# Patient Record
Sex: MALE | Race: WHITE | NOT HISPANIC OR LATINO | Employment: OTHER | URBAN - METROPOLITAN AREA
[De-identification: names, ages, dates, MRNs, and addresses within clinical notes are randomized per-mention and may not be internally consistent; named-entity substitution may affect disease eponyms.]

---

## 2018-04-27 ENCOUNTER — HOSPITAL ENCOUNTER (INPATIENT)
Facility: HOSPITAL | Age: 78
LOS: 2 days | Discharge: HOME | End: 2018-05-01
Attending: INTERNAL MEDICINE | Admitting: INTERNAL MEDICINE

## 2018-04-27 ENCOUNTER — APPOINTMENT (OUTPATIENT)
Dept: CARDIOLOGY | Facility: HOSPITAL | Age: 78
End: 2018-04-27

## 2018-04-27 DIAGNOSIS — I45.9 HEART BLOCK: Primary | ICD-10-CM

## 2018-04-27 LAB
ANION GAP SERPL CALCULATED.3IONS-SCNC: 8 MMOL/L (ref 3–11)
BH CV ECHO MEAS - AO MAX PG (FULL): 5.3 MMHG
BH CV ECHO MEAS - AO MAX PG: 9 MMHG
BH CV ECHO MEAS - AO ROOT AREA (BSA CORRECTED): 1.5
BH CV ECHO MEAS - AO ROOT AREA: 8.8 CM^2
BH CV ECHO MEAS - AO ROOT DIAM: 3.3 CM
BH CV ECHO MEAS - AO V2 MAX: 152 CM/SEC
BH CV ECHO MEAS - AVA(V,A): 2.1 CM^2
BH CV ECHO MEAS - AVA(V,D): 2.1 CM^2
BH CV ECHO MEAS - BSA(HAYCOCK): 2.3 M^2
BH CV ECHO MEAS - BSA: 2.2 M^2
BH CV ECHO MEAS - BZI_BMI: 35.6 KILOGRAMS/M^2
BH CV ECHO MEAS - BZI_METRIC_HEIGHT: 172.7 CM
BH CV ECHO MEAS - BZI_METRIC_WEIGHT: 106.1 KG
BH CV ECHO MEAS - CONTRAST EF (2CH): 59.5 ML/M^2
BH CV ECHO MEAS - CONTRAST EF 4CH: 61.4 ML/M^2
BH CV ECHO MEAS - EDV(CUBED): 161.2 ML
BH CV ECHO MEAS - EDV(MOD-SP2): 126 ML
BH CV ECHO MEAS - EDV(MOD-SP4): 127 ML
BH CV ECHO MEAS - EDV(TEICH): 143.9 ML
BH CV ECHO MEAS - EF(CUBED): 73.7 %
BH CV ECHO MEAS - EF(MOD-SP2): 59.5 %
BH CV ECHO MEAS - EF(MOD-SP4): 61.4 %
BH CV ECHO MEAS - EF(TEICH): 65 %
BH CV ECHO MEAS - ESV(CUBED): 42.4 ML
BH CV ECHO MEAS - ESV(MOD-SP2): 51 ML
BH CV ECHO MEAS - ESV(MOD-SP4): 49 ML
BH CV ECHO MEAS - ESV(TEICH): 50.4 ML
BH CV ECHO MEAS - FS: 35.9 %
BH CV ECHO MEAS - IVS/LVPW: 1.1
BH CV ECHO MEAS - IVSD: 1.2 CM
BH CV ECHO MEAS - LA DIMENSION: 4.7 CM
BH CV ECHO MEAS - LA/AO: 1.4
BH CV ECHO MEAS - LAT PEAK E' VEL: 5.6 CM/SEC
BH CV ECHO MEAS - LV DIASTOLIC VOL/BSA (35-75): 58.1 ML/M^2
BH CV ECHO MEAS - LV MASS(C)D: 262.4 GRAMS
BH CV ECHO MEAS - LV MASS(C)DI: 120.1 GRAMS/M^2
BH CV ECHO MEAS - LV MAX PG: 3.7 MMHG
BH CV ECHO MEAS - LV SYSTOLIC VOL/BSA (12-30): 22.4 ML/M^2
BH CV ECHO MEAS - LV V1 MAX: 95.3 CM/SEC
BH CV ECHO MEAS - LVIDD: 5.4 CM
BH CV ECHO MEAS - LVIDS: 3.5 CM
BH CV ECHO MEAS - LVLD AP2: 8.8 CM
BH CV ECHO MEAS - LVLD AP4: 8.8 CM
BH CV ECHO MEAS - LVLS AP2: 7.7 CM
BH CV ECHO MEAS - LVLS AP4: 7.5 CM
BH CV ECHO MEAS - LVOT AREA (M): 3.5 CM^2
BH CV ECHO MEAS - LVOT AREA: 3.4 CM^2
BH CV ECHO MEAS - LVOT DIAM: 2.1 CM
BH CV ECHO MEAS - LVPWD: 1.1 CM
BH CV ECHO MEAS - MED PEAK E' VEL: 8.95 CM/SEC
BH CV ECHO MEAS - MV A MAX VEL: 32.7 CM/SEC
BH CV ECHO MEAS - MV DEC TIME: 0.14 SEC
BH CV ECHO MEAS - MV E MAX VEL: 127.1 CM/SEC
BH CV ECHO MEAS - MV E/A: 3.9
BH CV ECHO MEAS - PA ACC SLOPE: 2420 CM/SEC^2
BH CV ECHO MEAS - PA ACC TIME: 0.06 SEC
BH CV ECHO MEAS - PA PR(ACCEL): 53.6 MMHG
BH CV ECHO MEAS - RVDD: 4.2 CM
BH CV ECHO MEAS - RVSP: 58 MMHG
BH CV ECHO MEAS - SI(CUBED): 54.4 ML/M^2
BH CV ECHO MEAS - SI(MOD-SP2): 34.3 ML/M^2
BH CV ECHO MEAS - SI(MOD-SP4): 35.7 ML/M^2
BH CV ECHO MEAS - SI(TEICH): 42.8 ML/M^2
BH CV ECHO MEAS - SV(CUBED): 118.8 ML
BH CV ECHO MEAS - SV(MOD-SP2): 75 ML
BH CV ECHO MEAS - SV(MOD-SP4): 78 ML
BH CV ECHO MEAS - SV(TEICH): 93.5 ML
BH CV ECHO MEAS - TAPSE (>1.6): 2 CM2
BH CV ECHO MEAS - TR MAX V: 50 MMHG
BH CV ECHO MEAS - TR MAX VEL: 290 CM/SEC
BH CV ECHO MEASUREMENTS AVERAGE E/E' RATIO: 17.47
BH CV XLRA - RV BASE: 6.3 CM
BH CV XLRA - RV LENGTH: 8.3 CM
BH CV XLRA - RV MID: 5.4 CM
BH CV XLRA - TDI S': 9.45 CM/SEC
BUN BLD-MCNC: 20 MG/DL (ref 9–23)
BUN/CREAT SERPL: 16.7 (ref 7–25)
CALCIUM SPEC-SCNC: 8.7 MG/DL (ref 8.7–10.4)
CHLORIDE SERPL-SCNC: 105 MMOL/L (ref 99–109)
CO2 SERPL-SCNC: 30 MMOL/L (ref 20–31)
CREAT BLD-MCNC: 1.2 MG/DL (ref 0.6–1.3)
DEPRECATED RDW RBC AUTO: 48.1 FL (ref 37–54)
ERYTHROCYTE [DISTWIDTH] IN BLOOD BY AUTOMATED COUNT: 14.2 % (ref 11.3–14.5)
GFR SERPL CREATININE-BSD FRML MDRD: 59 ML/MIN/1.73
GLUCOSE BLD-MCNC: 123 MG/DL (ref 70–100)
GLUCOSE BLDC GLUCOMTR-MCNC: 152 MG/DL (ref 70–130)
HCT VFR BLD AUTO: 45.1 % (ref 38.9–50.9)
HGB BLD-MCNC: 14.3 G/DL (ref 13.1–17.5)
LEFT ATRIUM VOLUME INDEX: 42.7 ML/M2
MAXIMAL PREDICTED HEART RATE: 144 BPM
MCH RBC QN AUTO: 29.2 PG (ref 27–31)
MCHC RBC AUTO-ENTMCNC: 31.7 G/DL (ref 32–36)
MCV RBC AUTO: 92.2 FL (ref 80–99)
PLATELET # BLD AUTO: 188 10*3/MM3 (ref 150–450)
PMV BLD AUTO: 10 FL (ref 6–12)
POTASSIUM BLD-SCNC: 3.1 MMOL/L (ref 3.5–5.5)
RBC # BLD AUTO: 4.89 10*6/MM3 (ref 4.2–5.76)
SODIUM BLD-SCNC: 143 MMOL/L (ref 132–146)
STRESS TARGET HR: 122 BPM
TSH SERPL DL<=0.05 MIU/L-ACNC: 4.21 MIU/ML (ref 0.35–5.35)
WBC NRBC COR # BLD: 9.9 10*3/MM3 (ref 3.5–10.8)

## 2018-04-27 PROCEDURE — G0378 HOSPITAL OBSERVATION PER HR: HCPCS

## 2018-04-27 PROCEDURE — 85027 COMPLETE CBC AUTOMATED: CPT | Performed by: PHYSICIAN ASSISTANT

## 2018-04-27 PROCEDURE — 82962 GLUCOSE BLOOD TEST: CPT

## 2018-04-27 PROCEDURE — 80048 BASIC METABOLIC PNL TOTAL CA: CPT | Performed by: PHYSICIAN ASSISTANT

## 2018-04-27 PROCEDURE — 25010000002 ENOXAPARIN PER 10 MG: Performed by: PHYSICIAN ASSISTANT

## 2018-04-27 PROCEDURE — 93005 ELECTROCARDIOGRAM TRACING: CPT | Performed by: NURSE PRACTITIONER

## 2018-04-27 PROCEDURE — 84443 ASSAY THYROID STIM HORMONE: CPT | Performed by: PHYSICIAN ASSISTANT

## 2018-04-27 PROCEDURE — 25010000002 FUROSEMIDE PER 20 MG: Performed by: PHYSICIAN ASSISTANT

## 2018-04-27 PROCEDURE — 93306 TTE W/DOPPLER COMPLETE: CPT

## 2018-04-27 RX ORDER — ASPIRIN 81 MG/1
81 TABLET ORAL DAILY
Status: DISCONTINUED | OUTPATIENT
Start: 2018-04-27 | End: 2018-05-01 | Stop reason: HOSPADM

## 2018-04-27 RX ORDER — PANTOPRAZOLE SODIUM 40 MG/1
40 TABLET, DELAYED RELEASE ORAL EVERY MORNING
Status: DISCONTINUED | OUTPATIENT
Start: 2018-04-28 | End: 2018-05-01 | Stop reason: HOSPADM

## 2018-04-27 RX ORDER — AMLODIPINE BESYLATE 2.5 MG/1
2.5 TABLET ORAL
Status: DISCONTINUED | OUTPATIENT
Start: 2018-04-27 | End: 2018-04-28

## 2018-04-27 RX ORDER — ROPINIROLE 1 MG/1
1 TABLET, FILM COATED ORAL NIGHTLY
Status: ON HOLD | COMMUNITY
End: 2019-11-22

## 2018-04-27 RX ORDER — LEVOTHYROXINE SODIUM 88 UG/1
88 TABLET ORAL DAILY
Status: ON HOLD | COMMUNITY
End: 2019-11-22

## 2018-04-27 RX ORDER — TRAMADOL HYDROCHLORIDE 50 MG/1
50 TABLET ORAL EVERY 8 HOURS PRN
Status: DISCONTINUED | OUTPATIENT
Start: 2018-04-27 | End: 2018-04-30 | Stop reason: SDUPTHER

## 2018-04-27 RX ORDER — LISINOPRIL 20 MG/1
20 TABLET ORAL 2 TIMES DAILY
Status: ON HOLD | COMMUNITY
End: 2019-11-22

## 2018-04-27 RX ORDER — TERAZOSIN 5 MG/1
5 CAPSULE ORAL NIGHTLY
Status: DISCONTINUED | OUTPATIENT
Start: 2018-04-27 | End: 2018-05-01 | Stop reason: HOSPADM

## 2018-04-27 RX ORDER — HYDRALAZINE HYDROCHLORIDE 50 MG/1
50 TABLET, FILM COATED ORAL 2 TIMES DAILY
Status: DISCONTINUED | OUTPATIENT
Start: 2018-04-27 | End: 2018-05-01 | Stop reason: HOSPADM

## 2018-04-27 RX ORDER — DOXAZOSIN MESYLATE 4 MG/1
4 TABLET ORAL DAILY
Status: ON HOLD | COMMUNITY
End: 2019-11-22

## 2018-04-27 RX ORDER — OMEPRAZOLE 20 MG/1
20 CAPSULE, DELAYED RELEASE ORAL DAILY
Status: ON HOLD | COMMUNITY
End: 2019-11-22

## 2018-04-27 RX ORDER — HYDRALAZINE HYDROCHLORIDE 50 MG/1
50 TABLET, FILM COATED ORAL DAILY
COMMUNITY
End: 2019-11-22 | Stop reason: HOSPADM

## 2018-04-27 RX ORDER — NEBIVOLOL 10 MG/1
10 TABLET ORAL DAILY
Status: ON HOLD | COMMUNITY
End: 2019-11-22

## 2018-04-27 RX ORDER — FUROSEMIDE 10 MG/ML
40 INJECTION INTRAMUSCULAR; INTRAVENOUS ONCE
Status: COMPLETED | OUTPATIENT
Start: 2018-04-27 | End: 2018-04-27

## 2018-04-27 RX ORDER — TRAMADOL HYDROCHLORIDE 50 MG/1
50 TABLET ORAL EVERY 8 HOURS PRN
COMMUNITY

## 2018-04-27 RX ORDER — MIRTAZAPINE 30 MG/1
30 TABLET, FILM COATED ORAL NIGHTLY
Status: ON HOLD | COMMUNITY
End: 2019-11-22

## 2018-04-27 RX ORDER — FUROSEMIDE 40 MG/1
40 TABLET ORAL AS NEEDED
Status: ON HOLD | COMMUNITY
End: 2019-11-22

## 2018-04-27 RX ORDER — ENALAPRILAT 2.5 MG/2ML
1.25 INJECTION INTRAVENOUS EVERY 6 HOURS PRN
Status: DISCONTINUED | OUTPATIENT
Start: 2018-04-27 | End: 2018-05-01 | Stop reason: HOSPADM

## 2018-04-27 RX ORDER — POTASSIUM CHLORIDE 750 MG/1
40 CAPSULE, EXTENDED RELEASE ORAL AS NEEDED
Status: DISCONTINUED | OUTPATIENT
Start: 2018-04-27 | End: 2018-05-01 | Stop reason: HOSPADM

## 2018-04-27 RX ORDER — POTASSIUM CHLORIDE 1.5 G/1.77G
40 POWDER, FOR SOLUTION ORAL AS NEEDED
Status: DISCONTINUED | OUTPATIENT
Start: 2018-04-27 | End: 2018-05-01 | Stop reason: HOSPADM

## 2018-04-27 RX ORDER — MIRTAZAPINE 15 MG/1
30 TABLET, FILM COATED ORAL NIGHTLY
Status: DISCONTINUED | OUTPATIENT
Start: 2018-04-27 | End: 2018-05-01 | Stop reason: HOSPADM

## 2018-04-27 RX ORDER — POTASSIUM CHLORIDE 750 MG/1
20 CAPSULE, EXTENDED RELEASE ORAL ONCE
Status: COMPLETED | OUTPATIENT
Start: 2018-04-27 | End: 2018-04-27

## 2018-04-27 RX ORDER — ROPINIROLE 0.5 MG/1
1 TABLET, FILM COATED ORAL NIGHTLY
Status: DISCONTINUED | OUTPATIENT
Start: 2018-04-27 | End: 2018-05-01 | Stop reason: HOSPADM

## 2018-04-27 RX ORDER — LEVOTHYROXINE SODIUM 88 UG/1
88 TABLET ORAL
Status: DISCONTINUED | OUTPATIENT
Start: 2018-04-27 | End: 2018-05-01 | Stop reason: HOSPADM

## 2018-04-27 RX ORDER — LISINOPRIL 20 MG/1
20 TABLET ORAL 2 TIMES DAILY
Status: DISCONTINUED | OUTPATIENT
Start: 2018-04-27 | End: 2018-05-01 | Stop reason: HOSPADM

## 2018-04-27 RX ADMIN — POTASSIUM CHLORIDE 20 MEQ: 750 CAPSULE, EXTENDED RELEASE ORAL at 13:10

## 2018-04-27 RX ADMIN — AMLODIPINE BESYLATE 2.5 MG: 2.5 TABLET ORAL at 16:32

## 2018-04-27 RX ADMIN — POTASSIUM CHLORIDE 40 MEQ: 750 CAPSULE, EXTENDED RELEASE ORAL at 16:32

## 2018-04-27 RX ADMIN — HYDRALAZINE HYDROCHLORIDE 50 MG: 50 TABLET, FILM COATED ORAL at 20:45

## 2018-04-27 RX ADMIN — TERAZOSIN HYDROCHLORIDE ANHYDROUS 5 MG: 5 CAPSULE ORAL at 20:45

## 2018-04-27 RX ADMIN — ENOXAPARIN SODIUM 40 MG: 40 INJECTION SUBCUTANEOUS at 20:45

## 2018-04-27 RX ADMIN — ENALAPRILAT 1.25 MG: 1.25 INJECTION INTRAVENOUS at 16:33

## 2018-04-27 RX ADMIN — MIRTAZAPINE 30 MG: 15 TABLET, FILM COATED ORAL at 20:44

## 2018-04-27 RX ADMIN — LISINOPRIL 20 MG: 20 TABLET ORAL at 20:45

## 2018-04-27 RX ADMIN — TRAMADOL HYDROCHLORIDE 50 MG: 50 TABLET, COATED ORAL at 20:45

## 2018-04-27 RX ADMIN — FUROSEMIDE 40 MG: 10 INJECTION, SOLUTION INTRAMUSCULAR; INTRAVENOUS at 13:10

## 2018-04-27 RX ADMIN — ASPIRIN 81 MG: 81 TABLET, COATED ORAL at 13:10

## 2018-04-27 RX ADMIN — NICARDIPINE HYDROCHLORIDE 5 MG/HR: 0.1 INJECTION, SOLUTION INTRAVENOUS at 17:44

## 2018-04-27 RX ADMIN — POTASSIUM CHLORIDE 40 MEQ: 750 CAPSULE, EXTENDED RELEASE ORAL at 20:44

## 2018-04-27 RX ADMIN — ROPINIROLE 1 MG: 0.5 TABLET, FILM COATED ORAL at 20:43

## 2018-04-28 LAB
ANION GAP SERPL CALCULATED.3IONS-SCNC: 6 MMOL/L (ref 3–11)
BNP SERPL-MCNC: 887 PG/ML (ref 0–100)
BUN BLD-MCNC: 22 MG/DL (ref 9–23)
BUN/CREAT SERPL: 18.3 (ref 7–25)
CALCIUM SPEC-SCNC: 8.2 MG/DL (ref 8.7–10.4)
CHLORIDE SERPL-SCNC: 108 MMOL/L (ref 99–109)
CO2 SERPL-SCNC: 34 MMOL/L (ref 20–31)
CREAT BLD-MCNC: 1.2 MG/DL (ref 0.6–1.3)
GFR SERPL CREATININE-BSD FRML MDRD: 59 ML/MIN/1.73
GLUCOSE BLD-MCNC: 107 MG/DL (ref 70–100)
POTASSIUM BLD-SCNC: 3.7 MMOL/L (ref 3.5–5.5)
SODIUM BLD-SCNC: 148 MMOL/L (ref 132–146)

## 2018-04-28 PROCEDURE — 25010000002 ENOXAPARIN PER 10 MG: Performed by: PHYSICIAN ASSISTANT

## 2018-04-28 PROCEDURE — 80048 BASIC METABOLIC PNL TOTAL CA: CPT | Performed by: PHYSICIAN ASSISTANT

## 2018-04-28 PROCEDURE — 93005 ELECTROCARDIOGRAM TRACING: CPT | Performed by: PHYSICIAN ASSISTANT

## 2018-04-28 PROCEDURE — 25010000002 FUROSEMIDE PER 20 MG: Performed by: PHYSICIAN ASSISTANT

## 2018-04-28 PROCEDURE — G0378 HOSPITAL OBSERVATION PER HR: HCPCS

## 2018-04-28 PROCEDURE — 83880 ASSAY OF NATRIURETIC PEPTIDE: CPT | Performed by: PHYSICIAN ASSISTANT

## 2018-04-28 RX ORDER — FUROSEMIDE 10 MG/ML
40 INJECTION INTRAMUSCULAR; INTRAVENOUS ONCE
Status: COMPLETED | OUTPATIENT
Start: 2018-04-28 | End: 2018-04-28

## 2018-04-28 RX ORDER — POTASSIUM CHLORIDE 750 MG/1
20 CAPSULE, EXTENDED RELEASE ORAL ONCE
Status: COMPLETED | OUTPATIENT
Start: 2018-04-28 | End: 2018-04-28

## 2018-04-28 RX ORDER — AMLODIPINE BESYLATE 5 MG/1
5 TABLET ORAL
Status: DISCONTINUED | OUTPATIENT
Start: 2018-04-28 | End: 2018-05-01 | Stop reason: HOSPADM

## 2018-04-28 RX ADMIN — ENALAPRILAT 1.25 MG: 1.25 INJECTION INTRAVENOUS at 16:50

## 2018-04-28 RX ADMIN — PANTOPRAZOLE SODIUM 40 MG: 40 TABLET, DELAYED RELEASE ORAL at 06:09

## 2018-04-28 RX ADMIN — HYDRALAZINE HYDROCHLORIDE 50 MG: 50 TABLET, FILM COATED ORAL at 08:39

## 2018-04-28 RX ADMIN — LISINOPRIL 20 MG: 20 TABLET ORAL at 20:59

## 2018-04-28 RX ADMIN — ENOXAPARIN SODIUM 40 MG: 40 INJECTION SUBCUTANEOUS at 20:59

## 2018-04-28 RX ADMIN — POTASSIUM CHLORIDE 40 MEQ: 750 CAPSULE, EXTENDED RELEASE ORAL at 02:51

## 2018-04-28 RX ADMIN — FUROSEMIDE 40 MG: 10 INJECTION, SOLUTION INTRAMUSCULAR; INTRAVENOUS at 08:39

## 2018-04-28 RX ADMIN — ROPINIROLE 1 MG: 0.5 TABLET, FILM COATED ORAL at 20:59

## 2018-04-28 RX ADMIN — TRAMADOL HYDROCHLORIDE 50 MG: 50 TABLET, COATED ORAL at 21:06

## 2018-04-28 RX ADMIN — POTASSIUM CHLORIDE 20 MEQ: 750 CAPSULE, EXTENDED RELEASE ORAL at 08:39

## 2018-04-28 RX ADMIN — HYDRALAZINE HYDROCHLORIDE 50 MG: 50 TABLET, FILM COATED ORAL at 20:59

## 2018-04-28 RX ADMIN — LISINOPRIL 20 MG: 20 TABLET ORAL at 08:39

## 2018-04-28 RX ADMIN — MIRTAZAPINE 30 MG: 15 TABLET, FILM COATED ORAL at 20:59

## 2018-04-28 RX ADMIN — LEVOTHYROXINE SODIUM 88 MCG: 88 TABLET ORAL at 06:09

## 2018-04-28 RX ADMIN — ASPIRIN 81 MG: 81 TABLET, COATED ORAL at 08:38

## 2018-04-28 RX ADMIN — TERAZOSIN HYDROCHLORIDE ANHYDROUS 5 MG: 5 CAPSULE ORAL at 20:59

## 2018-04-28 RX ADMIN — AMLODIPINE BESYLATE 5 MG: 5 TABLET ORAL at 08:38

## 2018-04-29 LAB
GLUCOSE BLDC GLUCOMTR-MCNC: 103 MG/DL (ref 70–130)
GLUCOSE BLDC GLUCOMTR-MCNC: 114 MG/DL (ref 70–130)
GLUCOSE BLDC GLUCOMTR-MCNC: 119 MG/DL (ref 70–130)
GLUCOSE BLDC GLUCOMTR-MCNC: 122 MG/DL (ref 70–130)

## 2018-04-29 PROCEDURE — 82962 GLUCOSE BLOOD TEST: CPT

## 2018-04-29 PROCEDURE — G0378 HOSPITAL OBSERVATION PER HR: HCPCS

## 2018-04-29 PROCEDURE — 25010000002 ENOXAPARIN PER 10 MG: Performed by: PHYSICIAN ASSISTANT

## 2018-04-29 RX ADMIN — ROPINIROLE 1 MG: 0.5 TABLET, FILM COATED ORAL at 21:31

## 2018-04-29 RX ADMIN — HYDRALAZINE HYDROCHLORIDE 50 MG: 50 TABLET, FILM COATED ORAL at 08:19

## 2018-04-29 RX ADMIN — LISINOPRIL 20 MG: 20 TABLET ORAL at 08:19

## 2018-04-29 RX ADMIN — MIRTAZAPINE 30 MG: 15 TABLET, FILM COATED ORAL at 21:31

## 2018-04-29 RX ADMIN — NICARDIPINE HYDROCHLORIDE 5 MG/HR: 0.1 INJECTION, SOLUTION INTRAVENOUS at 17:35

## 2018-04-29 RX ADMIN — PANTOPRAZOLE SODIUM 40 MG: 40 TABLET, DELAYED RELEASE ORAL at 06:30

## 2018-04-29 RX ADMIN — LEVOTHYROXINE SODIUM 88 MCG: 88 TABLET ORAL at 06:30

## 2018-04-29 RX ADMIN — TERAZOSIN HYDROCHLORIDE ANHYDROUS 5 MG: 5 CAPSULE ORAL at 21:31

## 2018-04-29 RX ADMIN — ASPIRIN 81 MG: 81 TABLET, COATED ORAL at 08:19

## 2018-04-29 RX ADMIN — HYDRALAZINE HYDROCHLORIDE 50 MG: 50 TABLET, FILM COATED ORAL at 21:31

## 2018-04-29 RX ADMIN — TRAMADOL HYDROCHLORIDE 50 MG: 50 TABLET, COATED ORAL at 21:31

## 2018-04-29 RX ADMIN — LISINOPRIL 20 MG: 20 TABLET ORAL at 21:31

## 2018-04-29 RX ADMIN — AMLODIPINE BESYLATE 5 MG: 5 TABLET ORAL at 08:18

## 2018-04-30 ENCOUNTER — APPOINTMENT (OUTPATIENT)
Dept: GENERAL RADIOLOGY | Facility: HOSPITAL | Age: 78
End: 2018-04-30

## 2018-04-30 LAB
GLUCOSE BLDC GLUCOMTR-MCNC: 103 MG/DL (ref 70–130)
GLUCOSE BLDC GLUCOMTR-MCNC: 106 MG/DL (ref 70–130)
GLUCOSE BLDC GLUCOMTR-MCNC: 156 MG/DL (ref 70–130)

## 2018-04-30 PROCEDURE — C1892 INTRO/SHEATH,FIXED,PEEL-AWAY: HCPCS | Performed by: INTERNAL MEDICINE

## 2018-04-30 PROCEDURE — 25010000002 ENOXAPARIN PER 10 MG: Performed by: PHYSICIAN ASSISTANT

## 2018-04-30 PROCEDURE — 82962 GLUCOSE BLOOD TEST: CPT

## 2018-04-30 PROCEDURE — 33208 INSRT HEART PM ATRIAL & VENT: CPT | Performed by: INTERNAL MEDICINE

## 2018-04-30 PROCEDURE — 93005 ELECTROCARDIOGRAM TRACING: CPT | Performed by: INTERNAL MEDICINE

## 2018-04-30 PROCEDURE — C1898 LEAD, PMKR, OTHER THAN TRANS: HCPCS | Performed by: INTERNAL MEDICINE

## 2018-04-30 PROCEDURE — 71045 X-RAY EXAM CHEST 1 VIEW: CPT

## 2018-04-30 PROCEDURE — 99152 MOD SED SAME PHYS/QHP 5/>YRS: CPT | Performed by: INTERNAL MEDICINE

## 2018-04-30 PROCEDURE — 25010000002 FENTANYL CITRATE (PF) 100 MCG/2ML SOLUTION: Performed by: INTERNAL MEDICINE

## 2018-04-30 PROCEDURE — 25010000002 VANCOMYCIN: Performed by: PHYSICIAN ASSISTANT

## 2018-04-30 PROCEDURE — C1785 PMKR, DUAL, RATE-RESP: HCPCS | Performed by: INTERNAL MEDICINE

## 2018-04-30 PROCEDURE — 0JH606Z INSERTION OF PACEMAKER, DUAL CHAMBER INTO CHEST SUBCUTANEOUS TISSUE AND FASCIA, OPEN APPROACH: ICD-10-PCS | Performed by: INTERNAL MEDICINE

## 2018-04-30 PROCEDURE — 0 IOPAMIDOL PER 1 ML: Performed by: INTERNAL MEDICINE

## 2018-04-30 PROCEDURE — 25010000002 MIDAZOLAM PER 1 MG: Performed by: INTERNAL MEDICINE

## 2018-04-30 PROCEDURE — 02H63JZ INSERTION OF PACEMAKER LEAD INTO RIGHT ATRIUM, PERCUTANEOUS APPROACH: ICD-10-PCS | Performed by: INTERNAL MEDICINE

## 2018-04-30 PROCEDURE — 02HK3JZ INSERTION OF PACEMAKER LEAD INTO RIGHT VENTRICLE, PERCUTANEOUS APPROACH: ICD-10-PCS | Performed by: INTERNAL MEDICINE

## 2018-04-30 PROCEDURE — 99153 MOD SED SAME PHYS/QHP EA: CPT | Performed by: INTERNAL MEDICINE

## 2018-04-30 DEVICE — LD PM MRI TENDRIL LPA1200M46: Type: IMPLANTABLE DEVICE | Status: FUNCTIONAL

## 2018-04-30 DEVICE — GEN PM ASSURITY MRI DR RF PM2272: Type: IMPLANTABLE DEVICE | Status: FUNCTIONAL

## 2018-04-30 DEVICE — LD PM MRI TENDRIL LPA1200M58: Type: IMPLANTABLE DEVICE | Status: FUNCTIONAL

## 2018-04-30 RX ORDER — TRAMADOL HYDROCHLORIDE 50 MG/1
50 TABLET ORAL EVERY 8 HOURS PRN
Status: DISCONTINUED | OUTPATIENT
Start: 2018-04-30 | End: 2018-05-01 | Stop reason: HOSPADM

## 2018-04-30 RX ORDER — FENTANYL CITRATE 50 UG/ML
INJECTION, SOLUTION INTRAMUSCULAR; INTRAVENOUS AS NEEDED
Status: DISCONTINUED | OUTPATIENT
Start: 2018-04-30 | End: 2018-04-30 | Stop reason: HOSPADM

## 2018-04-30 RX ORDER — ONDANSETRON 2 MG/ML
4 INJECTION INTRAMUSCULAR; INTRAVENOUS EVERY 6 HOURS PRN
Status: DISCONTINUED | OUTPATIENT
Start: 2018-04-30 | End: 2018-05-01 | Stop reason: HOSPADM

## 2018-04-30 RX ORDER — NEBIVOLOL 10 MG/1
10 TABLET ORAL DAILY
Status: DISCONTINUED | OUTPATIENT
Start: 2018-04-30 | End: 2018-05-01 | Stop reason: HOSPADM

## 2018-04-30 RX ORDER — MORPHINE SULFATE 2 MG/ML
1 INJECTION, SOLUTION INTRAMUSCULAR; INTRAVENOUS EVERY 4 HOURS PRN
Status: DISCONTINUED | OUTPATIENT
Start: 2018-04-30 | End: 2018-05-01 | Stop reason: HOSPADM

## 2018-04-30 RX ORDER — HYDROCODONE BITARTRATE AND ACETAMINOPHEN 5; 325 MG/1; MG/1
2 TABLET ORAL EVERY 4 HOURS PRN
Status: DISCONTINUED | OUTPATIENT
Start: 2018-04-30 | End: 2018-05-01 | Stop reason: HOSPADM

## 2018-04-30 RX ORDER — LIDOCAINE HYDROCHLORIDE 10 MG/ML
INJECTION, SOLUTION EPIDURAL; INFILTRATION; INTRACAUDAL; PERINEURAL AS NEEDED
Status: DISCONTINUED | OUTPATIENT
Start: 2018-04-30 | End: 2018-04-30 | Stop reason: HOSPADM

## 2018-04-30 RX ORDER — NALOXONE HCL 0.4 MG/ML
0.4 VIAL (ML) INJECTION
Status: DISCONTINUED | OUTPATIENT
Start: 2018-04-30 | End: 2018-05-01 | Stop reason: HOSPADM

## 2018-04-30 RX ORDER — SODIUM CHLORIDE 9 MG/ML
250 INJECTION, SOLUTION INTRAVENOUS CONTINUOUS
Status: ACTIVE | OUTPATIENT
Start: 2018-04-30 | End: 2018-04-30

## 2018-04-30 RX ORDER — MIDAZOLAM HYDROCHLORIDE 1 MG/ML
INJECTION INTRAMUSCULAR; INTRAVENOUS AS NEEDED
Status: DISCONTINUED | OUTPATIENT
Start: 2018-04-30 | End: 2018-04-30 | Stop reason: HOSPADM

## 2018-04-30 RX ORDER — OXYCODONE AND ACETAMINOPHEN 10; 325 MG/1; MG/1
1 TABLET ORAL EVERY 4 HOURS PRN
Status: DISCONTINUED | OUTPATIENT
Start: 2018-04-30 | End: 2018-05-01 | Stop reason: HOSPADM

## 2018-04-30 RX ORDER — TEMAZEPAM 7.5 MG/1
7.5 CAPSULE ORAL NIGHTLY PRN
Status: DISCONTINUED | OUTPATIENT
Start: 2018-04-30 | End: 2018-05-01 | Stop reason: HOSPADM

## 2018-04-30 RX ADMIN — LISINOPRIL 20 MG: 20 TABLET ORAL at 08:54

## 2018-04-30 RX ADMIN — ROPINIROLE 1 MG: 0.5 TABLET, FILM COATED ORAL at 20:08

## 2018-04-30 RX ADMIN — NICARDIPINE HYDROCHLORIDE 5 MG/HR: 0.1 INJECTION, SOLUTION INTRAVENOUS at 18:52

## 2018-04-30 RX ADMIN — HYDRALAZINE HYDROCHLORIDE 50 MG: 50 TABLET, FILM COATED ORAL at 20:08

## 2018-04-30 RX ADMIN — HYDRALAZINE HYDROCHLORIDE 50 MG: 50 TABLET, FILM COATED ORAL at 08:54

## 2018-04-30 RX ADMIN — VANCOMYCIN HYDROCHLORIDE 2000 MG: 1 INJECTION, POWDER, LYOPHILIZED, FOR SOLUTION INTRAVENOUS at 15:49

## 2018-04-30 RX ADMIN — ENOXAPARIN SODIUM 40 MG: 40 INJECTION SUBCUTANEOUS at 20:08

## 2018-04-30 RX ADMIN — TRAMADOL HYDROCHLORIDE 50 MG: 50 TABLET, COATED ORAL at 11:37

## 2018-04-30 RX ADMIN — NEBIVOLOL HYDROCHLORIDE 10 MG: 10 TABLET ORAL at 20:41

## 2018-04-30 RX ADMIN — AMLODIPINE BESYLATE 5 MG: 5 TABLET ORAL at 08:54

## 2018-04-30 RX ADMIN — LISINOPRIL 20 MG: 20 TABLET ORAL at 20:07

## 2018-04-30 RX ADMIN — TEMAZEPAM 7.5 MG: 7.5 CAPSULE ORAL at 20:08

## 2018-04-30 RX ADMIN — ASPIRIN 81 MG: 81 TABLET, COATED ORAL at 08:54

## 2018-04-30 RX ADMIN — TERAZOSIN HYDROCHLORIDE ANHYDROUS 5 MG: 5 CAPSULE ORAL at 20:08

## 2018-04-30 RX ADMIN — TRAMADOL HYDROCHLORIDE 50 MG: 50 TABLET, COATED ORAL at 20:08

## 2018-04-30 RX ADMIN — MIRTAZAPINE 30 MG: 15 TABLET, FILM COATED ORAL at 20:08

## 2018-04-30 RX ADMIN — ENALAPRILAT 1.25 MG: 1.25 INJECTION INTRAVENOUS at 00:17

## 2018-05-01 VITALS
DIASTOLIC BLOOD PRESSURE: 74 MMHG | TEMPERATURE: 98.7 F | OXYGEN SATURATION: 94 % | RESPIRATION RATE: 18 BRPM | HEIGHT: 68 IN | WEIGHT: 234 LBS | HEART RATE: 77 BPM | SYSTOLIC BLOOD PRESSURE: 146 MMHG | BODY MASS INDEX: 35.46 KG/M2

## 2018-05-01 LAB — GLUCOSE BLDC GLUCOMTR-MCNC: 111 MG/DL (ref 70–130)

## 2018-05-01 PROCEDURE — 82962 GLUCOSE BLOOD TEST: CPT

## 2018-05-01 RX ORDER — AMLODIPINE BESYLATE 5 MG/1
5 TABLET ORAL
Qty: 30 TABLET | Refills: 11 | Status: ON HOLD | OUTPATIENT
Start: 2018-05-02 | End: 2019-11-22

## 2018-05-01 RX ORDER — ASPIRIN 81 MG/1
81 TABLET ORAL DAILY
Qty: 30 TABLET | Refills: 11 | Status: ON HOLD | OUTPATIENT
Start: 2018-05-02 | End: 2019-11-22

## 2018-05-01 RX ADMIN — PANTOPRAZOLE SODIUM 40 MG: 40 TABLET, DELAYED RELEASE ORAL at 06:27

## 2018-05-01 RX ADMIN — ENALAPRILAT 1.25 MG: 1.25 INJECTION INTRAVENOUS at 06:28

## 2018-05-01 RX ADMIN — LISINOPRIL 20 MG: 20 TABLET ORAL at 09:21

## 2018-05-01 RX ADMIN — HYDRALAZINE HYDROCHLORIDE 50 MG: 50 TABLET, FILM COATED ORAL at 09:21

## 2018-05-01 RX ADMIN — LEVOTHYROXINE SODIUM 88 MCG: 88 TABLET ORAL at 06:28

## 2018-05-01 RX ADMIN — NEBIVOLOL HYDROCHLORIDE 10 MG: 10 TABLET ORAL at 09:21

## 2018-05-01 RX ADMIN — AMLODIPINE BESYLATE 5 MG: 5 TABLET ORAL at 09:21

## 2018-05-01 RX ADMIN — ASPIRIN 81 MG: 81 TABLET, COATED ORAL at 09:21

## 2018-05-01 NOTE — DISCHARGE SUMMARY
Date of Discharge:  5/1/2018              [facility]  Date of Admit: 4/27/2018    No Known Provider      Discharge Diagnosis:Active Problems:    Heart block      Hospital Course: Mr. Tariq is a pleasant 77-year-old male who was admitted to [facility] on 4/27/18 due to complete heart block.  A 2-D echocardiogram revealed a normal ejection fraction.  Mr. Tariq underwent placement of a [employer] dual-chamber pacemaker.  He tolerated the procedure well, there were no complications.  Chest x-ray reveals excellent lead placement with no pneumothorax.  Today Mr. Tariq is denying any complaints and says that he feels much better and is therefore for ready for discharge.    Procedures Performed  Procedure(s):  Pacemaker DC new       Consults     No orders found from 3/29/2018 to 4/28/2018.          Pertinent Test Results: DDD-PPM implant  .      Discharge Physical Exam:    General Appearance No acute distress   Neck No adenopathy, supple, trachea midline, no JVD   Lungs Clear to auscultation,respirations regular, even and unlabored   Heart Regular rhythm and normal rate, normal S1 and S2, no murmur, no gallop, no rub, no click   Chest wall No abnormalities observed, ppm site c/d/i   Abdomen Normal bowel sounds, no masses, no hepatomegaly, soft   Extremities Moves all extremities well, no edema, no cyanosis, no redness   Neurological Alert and oriented x 3     Discharge Medications   Marciano [place]   Home Medication Instructions [id number]:[id number]    Printed on:05/01/18 1022   Medication Information                      amLODIPine (NORVASC) 5 MG tablet  Take 1 tablet by mouth Daily.             aspirin 81 MG EC tablet  Take 1 tablet by mouth Daily.             cholecalciferol (VITAMIN D3) 90022 units capsule  Take 50,000 Units by mouth 1 (One) Time Per Week.             doxazosin (CARDURA) 4 MG tablet  Take 4 mg by mouth Daily.             furosemide (LASIX) 40 MG tablet  Take 40 mg by mouth  As Needed.             hydrALAZINE (APRESOLINE) 50 MG tablet  Take 50 mg by mouth 2 (Two) Times a Day.             levothyroxine (SYNTHROID, LEVOTHROID) 88 MCG tablet  Take 88 mcg by mouth Daily.             lisinopril (PRINIVIL,ZESTRIL) 20 MG tablet  Take 20 mg by mouth 2 (Two) Times a Day.             mirtazapine (REMERON) 30 MG tablet  Take 30 mg by mouth Every Night.             nebivolol (BYSTOLIC) 10 MG tablet  Take 10 mg by mouth Daily.             omeprazole (priLOSEC) 20 MG capsule  Take 20 mg by mouth Daily.             rOPINIRole (REQUIP) 1 MG tablet  Take 1 mg by mouth Every Night. Take 1 hour before bedtime.             traMADol (ULTRAM) 50 MG tablet  Take 50 mg by mouth Every 8 (Eight) Hours As Needed for Moderate Pain .                 Discharge Diet: cardiac    Activity at Discharge: as tolerated    Discharge disposition: home    Condition on Discharge: stable    Follow-up Appointments  No future appointments.  Additional Instructions for the Follow-ups that You Need to Schedule     Discharge Follow-up with Specified Provider: Romy Su; 1 Week    As directed      To:  Romy Su    Follow Up:  1 Week    Follow Up Details:  wound check         Discharge Follow-up with Specified Provider: Dr. Pizano; 3 Months    As directed      To:  Dr. Pizano    Follow Up:  3 Months    Follow Up Details:  SJM PPM check                ROMULO Laurent  05/01/18  10:22 AM

## 2018-05-01 NOTE — DISCHARGE PLACEMENT REQUEST
"Yovani Murry (77 y.o. Male) in room N621       Haleigh Warren [phone]    Date of Birth Social Security Number Address Home Phone MRN    1940  [address]   [place] [address] [phone] [id number]    Tenriism Marital Status          None        Admission Date Admission Type Admitting Provider Attending Provider Department, Room/Bed    4/27/18 Elective Kain Pizano MD Skinner, William Hal, MD [facility] 6A, N621/1    Discharge Date Discharge Disposition Discharge Destination         Home or Self Care              Attending Provider:  Kain Pizano MD    Allergies:  No Known Allergies    Isolation:  None   Infection:  None   Code Status:  FULL    Ht:  172.7 cm (68\")   Wt:  106 kg (234 lb)    Admission Cmt:  None   Principal Problem:  None                Active Insurance as of 4/27/2018     Primary Coverage     Payor Plan Insurance Group Employer/Plan Group    MEDICARE MEDICARE B ONLY      Payor Plan Address Payor Plan Phone Number Effective From Effective To    [address] [address] [phone] 12/1/2006     [place] [address]       Subscriber Name Subscriber Birth Date Member ID       YOVANI MURRY 1940 [id number]           Secondary Coverage     Payor Plan Insurance Group Employer/Plan Group    [employer] [place]      Payor Plan Address Payor Plan Phone Number Effective From Effective To    [address] [address]  1/1/2014     [place] [address]       Subscriber Name Subscriber Birth Date Member ID       YOVANI MURRY 1940 [id number]                 Emergency Contacts      (Rel.) Home Phone Work Phone Mobile Phone    Yamilex Murry (Spouse) [phone] -- --               History & Physical      Kain Pizano MD at 4/27/2018 12:52 PM                                               [facility] Consult Note      Patient Care Team:  No Known Provider as PCP - General  No Known Provider  Kain Pizano " MD Matt    Subjective     History of Present Illness:  Mr. Tariq is a pleasant 77-year-old male with a history of hypertension former tobacco abuse and COPD.  He is complaining of a three-day history of dizziness, shortness of breath, and fatigue.  He presented to [facility] last night where he was found to be in complete heart block.  He has been transferred to [facility] for further evaluation.  Currently he is denying any chest pain, shortness of breath, or palpitations.  He has been taking by systolic 10 mg by mouth daily.  His last dose was last night at approximately 7:30 PM.  EKG does reveal high degree AV block.      Current Facility-Administered Medications:   •  furosemide (LASIX) injection 40 mg, 40 mg, Intravenous, Once, ROMULO Laurent  •  potassium chloride (KLOR-CON) packet 40 mEq, 40 mEq, Oral, PRN, ROMULO Laurent  •  potassium chloride (MICRO-K) CR capsule 20 mEq, 20 mEq, Oral, Once, ROMULO Laurent  •  potassium chloride (MICRO-K) CR capsule 40 mEq, 40 mEq, Oral, PRN, ROMULO Laurent    Social History     Social History   • Marital status:      Spouse name: N/A   • Number of children: N/A   • Years of education: N/A     Occupational History   • Not on file.     Social History Main Topics   • Smoking status: Not on file   • Smokeless tobacco: Not on file   • Alcohol use Not on file   • Drug use: Unknown   • Sexual activity: Not on file     Other Topics Concern   • Not on file     Social History Narrative   • No narrative on file       No family history on file.    Review of Systems   Constitutional: Positive for activity change and fatigue.   HENT: Negative.    Eyes: Negative.    Respiratory: Positive for shortness of breath.    Cardiovascular: Negative.    Gastrointestinal: Negative.    Endocrine: Negative.    Genitourinary: Negative.    Musculoskeletal: Positive for arthralgias.   Skin: Negative.    Allergic/Immunologic: Negative.     Neurological: Positive for dizziness and light-headedness.   Hematological: Negative.    Psychiatric/Behavioral: Negative.           Objective     Vital Signs       No intake or output data in the 24 hours ending 04/27/18 1252  No intake/output data recorded.    Physical Exam   Constitutional: He appears healthy. No distress.   HENT:   Nose: Nose normal.   Eyes: Conjunctivae are normal. Pupils are equal, round, and reactive to light.   Neck: Normal range of motion. Neck supple. No JVD present.   Cardiovascular: Regular rhythm and normal heart sounds.  Bradycardia present.  Exam reveals no gallop and no S3.    No murmur heard.  Pulmonary/Chest: Effort normal and breath sounds normal. He has no wheezes. He has no rales. He exhibits no tenderness.   Abdominal: Soft. Bowel sounds are normal. There is no tenderness.   Musculoskeletal: Normal range of motion. He exhibits no edema.   Neurological: He is alert and oriented to person, place, and time.   Skin: Skin is warm and dry.           Results Review:    I reviewed the patient's new clinical results.    WBC No results found for: WBC   HGB No results found for: HGB   HCT No results found for: HCT   Platlets No results found for: LABPLAT     PT/INR:    No results found for: PROTIME/No results found for: INR    Sodium No results found for: NA   Potassium No results found for: K   Chloride No results found for: CL   Bicarbonate No results found for: PLASMABICARB   BUN No results found for: BUN   Creatinine No results found for: CREATININE   Calcium No results found for: CALCIUM   Magnesium No results found for: MG   Troponin       No components found for: TROP                                                EKG: High degree AV block    Assessment/Plan   Patient Active Problem List   Diagnosis   • Heart block     Discontinue beta blocker  Echocardiogram to assess LV systolic function  Check TSH  Gentle diuresis  Up with assistance only    I discussed the patients findings  "and my recommendations with patient and nursing staff    ROMULO Laurent  18  12:52 PM        [facility] 6A  [address] [address]  Phone:  [phone]  Fax:   Date Ordered: May 1, 2018         Patient:  Leonel Tariq MRN:  [id number]   [address]   [place] [address] :  1940  SSN:    Phone: [phone] Sex:  M     Weight: 106 kg (234 lb)         Ht Readings from Last 1 Encounters:   18 172.7 cm (68\")         Oxygen Therapy         (Order ID: [id number])    Diagnosis:  Heart block (I45.9 [ICD-10-CM] 426.9 [ICD-9-CM])   Quantity:  1     Delivery Modality: Nasal Cannula  Liters Per Minute: 2  Duration: Continuous  Equipment:  Oxygen Concentrator &  &  Portable Gaseous Oxygen System & Portable Oxygen Contents Gaseous &  Conserving Regulator  The face to face evaluation was performed on: 2018  Length of Need (99 Months = Lifetime): 99 Months = Lifetime            Verbal Order Mode: Telephone with readback   Authorizing Provider: Kain Pizano MD  Authorizing Provider's NPI: [id number]     Order Entered By: Hayley Cabezas RN 2018 11:56 AM     Electronically signed by:                 Electronically signed by Kain Pizano MD at 2018  4:58 PM       18 1144  --  --  --  --  resting room air   86   18 1143  --  --  --  --  resting room air   85     Oxygen Therapy [EQ60] (Order [id number])   Order   Date: 2018 Department: [address] Ordering/Authorizing: Kain Pizano MD   Order History   Outpatient   Date/Time Action Taken User Additional Information   18 1156 Sign Hayley Cabezas RN Ordering Mode: Telephone with readback   Order Details     Frequency Duration Priority Order Class   None None Routine External   Start Date/Time     Start Date   18   Order Questions     Question Answer Comment   Delivery Modality Nasal Cannula    Liters Per Minute: 2  "   Duration: Continuous    Equipment  Oxygen Concentrator &  &  Portable Gaseous Oxygen System & Portable Oxygen Contents Gaseous &  Conserving Regulator    The face to face evaluation was performed on 5/1/2018    Length of Need (99 Months = Lifetime) 99 Months = Lifetime    Note:  Custom values to enter length of need for DME          Verbal Order Info     Action Created on Order Mode Entered by Responsible Provider Signed by Signed on   Ordering 05/01/18 1156 Telephone with readback SMILEY Keene MD     Source Order Set / Preference List     Preference List   AMB SUPPLIES [1416]   Clinical Indications      ICD-10-CM ICD-9-CM   Heart block  - Primary     I45.9 426.9   Reprint Order Requisition     [facility]GEN [facility] (Order #[id number]) on 5/1/18   Encounter-Level Cardiology Documents:     There are no encounter-level cardiology documents.      Encounter     View Encounter               Order Provider Info         Office phone Pager E-mail   Ordering User Hayley Cabezas RN -- -- --   Authorizing Provider Kain Pizano [phone] -- --   Attending Provider Kain Pizano [phone] -- --   Entered By Hayley Cabezas RN -- -- --   Ordering Provider Kain Pizano [phone] -- --   Linked Charges     No charges linked to this procedure   Order Transmittal Tracking     [facility] (Order #[id number]) on 5/1/18   Authorized by:  Kain Pizano MD  (NPI: [id number])       Lab Component SmartPhrase Guide     [facility] (Order #[id number]) on 5/1/18

## 2018-05-01 NOTE — PAYOR COMM NOTE
"Yovani Murry (77 y.o. Male)   Id # [id number]  Maribel Lance RN, BSN  Phone # [phone]  Fax # [phone]    Date of Birth Social Security Number Address Home Phone MRN    1940  [address]   [place] [address] [phone] [id number]    Buddhist Marital Status          None        Admission Date Admission Type Admitting Provider Attending Provider Department, Room/Bed    4/27/18 Elective Kain Pizano MD  [facility] 6A, N621/1    Discharge Date Discharge Disposition Discharge Destination        5/1/2018 Home or Self Care              Attending Provider:  (none)   Allergies:  No Known Allergies    Isolation:  None   Infection:  None   Code Status:  FULL    Ht:  172.7 cm (68\")   Wt:  106 kg (234 lb)    Admission Cmt:  None   Principal Problem:  None                Active Insurance as of 4/27/2018     Primary Coverage     Payor Plan Insurance Group Employer/Plan Group    MEDICARE MEDICARE B ONLY      Payor Plan Address Payor Plan Phone Number Effective From Effective To    [address] [address] [phone] 12/1/2006     [place] [address]       Subscriber Name Subscriber Birth Date Member ID       YOVANI MURRY 1940 [id number]           Secondary Coverage     Payor Plan Insurance Group Employer/Plan Group    [employer]      Payor Plan Address Payor Plan Phone Number Effective From Effective To    [address] [address]  1/1/2014     [place] [address]       Subscriber Name Subscriber Birth Date Member ID       YOVANI MURRY 1940 [id number]                 Emergency Contacts      (Rel.) Home Phone Work Phone Mobile Phone    Yamilex Murry (Spouse) [phone] -- --      Physicians     Panel Physicians Referring Physician Case Authorizing Physician   Kain Pizano MD (Primary)     Procedures     Pacemaker DC new   Conclusion     PROCEDURE NOTE - IMPLANTATION OF A DUAL-CHAMBER PERMANENT PACEMAKER         DATE OF PROCEDURE:        "                  04/30/18        REFERRING PHYSICIAN:                       [facility]        PROCEDURES PERFORMED: Implantation of a dual-chamber permanent pacemaker.     INDICATION FOR THIS PROCEDURE:  Third-degree AV block with permanent and irreversible bradycardia that is sympotmatic with fatigue, dizziness, and shortness of breath.     DESCRIPTION OF THE PROCEDURE: The patient was brought to the Cardiovascular Laboratory in a fasting, nonsedated state. The risks and benefits of conscious sedation and implantation of a dual-chamber permanent pacemaker were explained to the patient and written informed consent was obtained. The patient was then prepped and draped in the usual sterile manner. The left infraclavicular fossa area was then anesthetized with 1% lidocaine, and the skin was incised using a scalpel. The pocket was created along the prepectoralis fascia using both sharp and blunt dissection. Access to the subclavian vein was then obtained  using the modified Seldinger technique a flexible guidewire was advanced under fluoroscopy into the inferior vena cava below the diaphragm.  A passive fixation ventricular lead was positioned in the  right ventricular apex and an active fixation atrial lead was positioned in the right atrial appendage.  Pacing threshold were excellent The right ventricular lead threshold was measured at 0.5 volts at 500 milliseconds with an R wave measured at 12 millivolts and an impedance measured at 560 ohms. The right atrial lead threshold was measured at 1 volts at 500 milliseconds with a P wave measured at 1.1 millivolts and an impedance measured at 4:30 ohms. No diaphragmatic stimulation was noted with pacing at 10 volts through either the right atrial or right ventricular leads. The leads were attached to the prepectoralis fascia using a 2-0 silk and a sewing ring. The leads were attached to the generator, and the generator was placed in the pocket after the pocket was  irrigated with Ancef solution. The pocket was closed with 2-0 Vicryl, and the skin was closed with 3-0 and 4-0 Vicryl. Steri-Strips and a pressure dressing were then applied. Estimated blood loss was minimal. Hemostasis was adequate.  The pacemaker was then programmed to its final settings. The patient tolerated the procedure without difficulty and was transferred to his room in stable condition.     COMPLICATIONS: none     IMPLANTED HARDWARD:  1. The pacemaker generator was a St. Jonnathan MR I safe model PM 2272 with a serial No. of [id number]  2. The right atrial lead was a St. Jonnathan MR I safe model LPA 1200M with a serial No. of  [id number].  3. The right ventricular lead was a LPA model 1200M with a serial No. of seed [id number].     FINAL PROGRAM PARAMETERS: The device was set at DDDR with the lower rate of 60 beats per minute and the upper rate of 120 beats per minute.      FINAL IMPRESSION:   1. Successful implantation of a dual chamber permanent pacemaker     RECOMMENDATIONS:   1. The patient will be monitored on telemetry  2. If stable the patient will be discharged to home in the morning after a CXR      Kain Pizano MD  04/30/18  5:17 PM             ROMULO Laurent Physician Assistant Cosign Needed Cardiology Discharge Summaries Date of Service: 5/1/2018 10:20 AM         []Manual[]Template  []Copied  Date of Discharge:  5/1/2018              [facility]  Date of Admit: 4/27/2018     No Known Provider        Discharge Diagnosis:Active Problems:    Heart block        Hospital Course: Mr. Tariq is a pleasant 77-year-old male who was admitted to [facility] on 4/27/18 due to complete heart block.  A 2-D echocardiogram revealed a normal ejection fraction.  Mr. Tariq underwent placement of a [employer] dual-chamber pacemaker.  He tolerated the procedure well, there were no complications.  Chest x-ray reveals excellent lead placement with no pneumothorax.  Today Mr. Tariq  is denying any complaints and says that he feels much better and is therefore for ready for discharge.     Procedures Performed  Procedure(s):  Pacemaker DC new         Consults      No orders found from 3/29/2018 to 4/28/2018.             Pertinent Test Results: DDD-PPM implant  .        Discharge Physical Exam:     General Appearance No acute distress   Neck No adenopathy, supple, trachea midline, no JVD   Lungs Clear to auscultation,respirations regular, even and unlabored   Heart Regular rhythm and normal rate, normal S1 and S2, no murmur, no gallop, no rub, no click   Chest wall No abnormalities observed, ppm site c/d/i   Abdomen Normal bowel sounds, no masses, no hepatomegaly, soft   Extremities Moves all extremities well, no edema, no cyanosis, no redness   Neurological Alert and oriented x 3      Discharge Medications                Leonel Tariq   Home Medication Instructions NIKKI:[id number]     Printed on:05/01/18 1022   Medication Information                                       amLODIPine (NORVASC) 5 MG tablet  Take 1 tablet by mouth Daily.                   aspirin 81 MG EC tablet  Take 1 tablet by mouth Daily.                   cholecalciferol (VITAMIN D3) 23191 units capsule  Take 50,000 Units by mouth 1 (One) Time Per Week.                   doxazosin (CARDURA) 4 MG tablet  Take 4 mg by mouth Daily.                   furosemide (LASIX) 40 MG tablet  Take 40 mg by mouth As Needed.                   hydrALAZINE (APRESOLINE) 50 MG tablet  Take 50 mg by mouth 2 (Two) Times a Day.                   levothyroxine (SYNTHROID, LEVOTHROID) 88 MCG tablet  Take 88 mcg by mouth Daily.                   lisinopril (PRINIVIL,ZESTRIL) 20 MG tablet  Take 20 mg by mouth 2 (Two) Times a Day.                   mirtazapine (REMERON) 30 MG tablet  Take 30 mg by mouth Every Night.                   nebivolol (BYSTOLIC) 10 MG tablet  Take 10 mg by mouth Daily.                   omeprazole (priLOSEC) 20 MG capsule  Take 20  mg by mouth Daily.                   rOPINIRole (REQUIP) 1 MG tablet  Take 1 mg by mouth Every Night. Take 1 hour before bedtime.                   traMADol (ULTRAM) 50 MG tablet  Take 50 mg by mouth Every 8 (Eight) Hours As Needed for Moderate Pain .                         Discharge Diet: cardiac     Activity at Discharge: as tolerated     Discharge disposition: home     Condition on Discharge: stable     Follow-up Appointments  No future appointments.       Additional Instructions for the Follow-ups that You Need to Schedule      Discharge Follow-up with Specified Provider: Romy Su; 1 Week    As directed     To:  Romy Su    Follow Up:  1 Week    Follow Up Details:  wound check            Discharge Follow-up with Specified Provider: Dr. Pizano; 3 Months    As directed     To:  Dr. Pizano    Follow Up:  3 Months    Follow Up Details:  SJM PPM check                    ROMULO Laurent  05/01/18  10:22 AM                 Discharge patient [ADT8] (Order [id number])   Order   Date: 5/1/2018 Department: [address] Released By/Authorizing: ROMULO Laurent (auto-released)   Discharge Info     Discharge Disposition Expected Discharge Date   Home or Self Care 05/01/18   Order History   Inpatient   Date/Time Action Taken User Additional Information   05/01/18 1020 Release ROMULO Laurent (auto-released) From Order: [id number]   05/01/18 1020 Complete ROMULO Laurent

## 2018-05-01 NOTE — PLAN OF CARE
Problem: Patient Care Overview  Goal: Plan of Care Review  Outcome: Ongoing (interventions implemented as appropriate)   05/01/18 6948   Coping/Psychosocial   Plan of Care Reviewed With patient   Plan of Care Review   Progress no change   OTHER   Outcome Summary VSS. L pressure dressing clean, dry & intact, arm in sling. However pt has been very restless and uncooperative overnight, removing tele stickers and ripping sling off. Paced on tele.        Problem: Arrhythmia/Dysrhythmia (Symptomatic) (Adult)  Goal: Signs and Symptoms of Listed Potential Problems Will be Absent, Minimized or Managed (Arrhythmia/Dysrhythmia)  Outcome: Ongoing (interventions implemented as appropriate)   05/01/18 7146   Goal/Outcome Evaluation   Problems Assessed (Arrhythmia/Dysrhythmia) all   Problems Present (Dysrhythmia) none

## 2019-11-15 ENCOUNTER — TRANSCRIBE ORDERS (OUTPATIENT)
Dept: ADMINISTRATIVE | Facility: HOSPITAL | Age: 79
End: 2019-11-15

## 2019-11-15 DIAGNOSIS — R94.39 ABNORMAL STRESS TEST: Primary | ICD-10-CM

## 2019-11-22 ENCOUNTER — HOSPITAL ENCOUNTER (OUTPATIENT)
Facility: HOSPITAL | Age: 79
Setting detail: HOSPITAL OUTPATIENT SURGERY
Discharge: HOME | End: 2019-11-22
Attending: INTERNAL MEDICINE | Admitting: INTERNAL MEDICINE

## 2019-11-22 VITALS
DIASTOLIC BLOOD PRESSURE: 85 MMHG | OXYGEN SATURATION: 93 % | HEART RATE: 73 BPM | RESPIRATION RATE: 16 BRPM | TEMPERATURE: 97.3 F | BODY MASS INDEX: 32.78 KG/M2 | SYSTOLIC BLOOD PRESSURE: 144 MMHG | HEIGHT: 68 IN | WEIGHT: 216.27 LBS

## 2019-11-22 DIAGNOSIS — R94.39 ABNORMAL STRESS TEST: ICD-10-CM

## 2019-11-22 PROCEDURE — 25010000002 PHENYLEPHRINE PER 1 ML: Performed by: INTERNAL MEDICINE

## 2019-11-22 PROCEDURE — C1894 INTRO/SHEATH, NON-LASER: HCPCS | Performed by: INTERNAL MEDICINE

## 2019-11-22 PROCEDURE — 80047 BASIC METABLC PNL IONIZED CA: CPT

## 2019-11-22 PROCEDURE — 0 IOPAMIDOL PER 1 ML: Performed by: INTERNAL MEDICINE

## 2019-11-22 PROCEDURE — 36415 COLL VENOUS BLD VENIPUNCTURE: CPT

## 2019-11-22 PROCEDURE — 25010000002 FENTANYL CITRATE (PF) 100 MCG/2ML SOLUTION: Performed by: INTERNAL MEDICINE

## 2019-11-22 PROCEDURE — 25010000002 HEPARIN (PORCINE) PER 1000 UNITS: Performed by: INTERNAL MEDICINE

## 2019-11-22 PROCEDURE — 93458 L HRT ARTERY/VENTRICLE ANGIO: CPT | Performed by: INTERNAL MEDICINE

## 2019-11-22 PROCEDURE — 85014 HEMATOCRIT: CPT

## 2019-11-22 PROCEDURE — 25010000002 MIDAZOLAM PER 1 MG: Performed by: INTERNAL MEDICINE

## 2019-11-22 PROCEDURE — C1769 GUIDE WIRE: HCPCS | Performed by: INTERNAL MEDICINE

## 2019-11-22 RX ORDER — AMIODARONE HYDROCHLORIDE 200 MG/1
200 TABLET ORAL DAILY
Qty: 30 TABLET | Refills: 5 | Status: SHIPPED | OUTPATIENT
Start: 2019-11-22 | End: 2019-11-23 | Stop reason: SDUPTHER

## 2019-11-22 RX ORDER — LIDOCAINE HYDROCHLORIDE 10 MG/ML
INJECTION, SOLUTION EPIDURAL; INFILTRATION; INTRACAUDAL; PERINEURAL AS NEEDED
Status: DISCONTINUED | OUTPATIENT
Start: 2019-11-22 | End: 2019-11-22 | Stop reason: HOSPADM

## 2019-11-22 RX ORDER — TAMSULOSIN HYDROCHLORIDE 0.4 MG/1
1 CAPSULE ORAL DAILY
COMMUNITY

## 2019-11-22 RX ORDER — ASPIRIN 325 MG
325 TABLET, DELAYED RELEASE (ENTERIC COATED) ORAL DAILY
Status: DISCONTINUED | OUTPATIENT
Start: 2019-11-22 | End: 2019-11-22 | Stop reason: HOSPADM

## 2019-11-22 RX ORDER — MIDAZOLAM HYDROCHLORIDE 1 MG/ML
INJECTION INTRAMUSCULAR; INTRAVENOUS AS NEEDED
Status: DISCONTINUED | OUTPATIENT
Start: 2019-11-22 | End: 2019-11-22 | Stop reason: HOSPADM

## 2019-11-22 RX ORDER — LEVOTHYROXINE SODIUM 0.1 MG/1
100 TABLET ORAL DAILY
COMMUNITY

## 2019-11-22 RX ORDER — CARVEDILOL 3.12 MG/1
3.12 TABLET ORAL 2 TIMES DAILY WITH MEALS
COMMUNITY

## 2019-11-22 RX ORDER — CHLORTHALIDONE 25 MG/1
25 TABLET ORAL DAILY
COMMUNITY
End: 2019-11-22 | Stop reason: HOSPADM

## 2019-11-22 RX ORDER — FENTANYL CITRATE 50 UG/ML
INJECTION, SOLUTION INTRAMUSCULAR; INTRAVENOUS AS NEEDED
Status: DISCONTINUED | OUTPATIENT
Start: 2019-11-22 | End: 2019-11-22 | Stop reason: HOSPADM

## 2019-11-22 RX ADMIN — ASPIRIN 325 MG: 325 TABLET, COATED ORAL at 10:26

## 2019-11-22 NOTE — H&P
Pre-Cardiac Catheterization Report  Cardiovascular Laboratory  [facility]      Patient:  Leonel Tariq  :  1940  PCP:  Carlos Rivera MD  PHONE:  [phone]    DATE: 2019    BRIEF HPI:  Leonel Tariq is a 79 y.o. male with hypertension, hypercholesterolemia, systolic CHF, sick sinus syndrome-previous permanent pacemaker, and atrial fibrillation.  He is complaining of increasing shortness of breath that is been occurring daily for weeks.  He states it is moderate in severity lasting minutes and associated with dyspnea on exertion, fatigue, edema, and syncope.  His symptoms increased with stress and are decreased with rest.  He recently underwent an abnormal stress test and now presents for left heart catheterization with possible intervention.    Cardiac Risk Factors:  advanced age (older than 55 for men, 65 for women), hypertension, male gender, obesity (BMI >= 30 kg/m2), smoking/ tobacco exposure    Anginal class in last 2 weeks:  No anginal symptoms    CHF Class in last 2 weeks:  NYHA Class II    Cardiogenic shock:  no    Cardiac arrest <24 hours:  no    Stress test within last 6 months:   yes   Details:    Previous cardiac catheterization:  no  Details:     Previous CABG:  no  Details:      Allergies:     IV contrast allergy:  no  No Known Allergies    MEDICATIONS:  Prior to Admission medications    Medication Sig Start Date End Date Taking? Authorizing Provider   amLODIPine (NORVASC) 5 MG tablet Take 1 tablet by mouth Daily. 18   Sunny Malin PA   aspirin 81 MG EC tablet Take 1 tablet by mouth Daily. 18   Sunny Malin PA   cholecalciferol (VITAMIN D3) 74387 units capsule Take 50,000 Units by mouth 1 (One) Time Per Week.    ProviderDante MD   doxazosin (CARDURA) 4 MG tablet Take 4 mg by mouth Daily.    ProviderDante MD   furosemide (LASIX) 40 MG tablet Take 40 mg by mouth As Needed.    ProviderDante MD   hydrALAZINE (APRESOLINE) 50  MG tablet Take 50 mg by mouth 2 (Two) Times a Day.    Dante Briscoe MD   levothyroxine (SYNTHROID, LEVOTHROID) 88 MCG tablet Take 88 mcg by mouth Daily.    Dante Briscoe MD   lisinopril (PRINIVIL,ZESTRIL) 20 MG tablet Take 20 mg by mouth 2 (Two) Times a Day.    Dante Briscoe MD   mirtazapine (REMERON) 30 MG tablet Take 30 mg by mouth Every Night.    Dante Briscoe MD   nebivolol (BYSTOLIC) 10 MG tablet Take 10 mg by mouth Daily.    Dante Briscoe MD   omeprazole (priLOSEC) 20 MG capsule Take 20 mg by mouth Daily.    Dante Briscoe MD   rOPINIRole (REQUIP) 1 MG tablet Take 1 mg by mouth Every Night. Take 1 hour before bedtime.    Dante Briscoe MD   traMADol (ULTRAM) 50 MG tablet Take 50 mg by mouth Every 8 (Eight) Hours As Needed for Moderate Pain .    Dante Briscoe MD       Past medical & surgical history, social and family history reviewed in the electronic medical record.    ROS:  Cardiovascular ROS: positive for - dyspnea on exertion, edema, irregular heartbeat, loss of consciousness and shortness of breath    Physical Exam:    Vitals:   Vitals:    11/22/19 0940   BP: 147/79   Pulse: 75   Resp: 18   Temp: 97.3 °F (36.3 °C)   SpO2: 99%          11/22/19  0940   Weight: 98.1 kg (216 lb 4.3 oz)       General Appearance:    Alert, cooperative, in no acute distress   Head:    Normocephalic, without obvious abnormality, atraumatic   Eyes:            Lids and lashes normal, conjunctivae and sclerae normal, no   icterus, no pallor, corneas clear, PERRLA   Ears:    Ears appear intact with no abnormalities noted   Neck:   No adenopathy, supple, trachea midline, no thyromegaly, +   carotid bruits, no JVD   Back:     No kyphosis present, no scoliosis present, range of motion normal   Lungs:    Wheezes, Rales, and rhonchi to auscultation,respirations regular, even and unlabored    Heart:    Regular rhythm and normal rate, normal S1 and S2, no            murmur, no  gallop, no rub, no click   Chest Wall:    No abnormalities observed   Abdomen:     Normal bowel sounds, no masses, no organomegaly, soft        non-tender, non-distended, no guarding, no rebound                tenderness   Rectal:     Deferred   Extremities:   Moves all extremities well, no edema, no cyanosis, no             redness   Pulses:   Pulses palpable and equal bilaterally   Skin:   No bleeding, bruising or rash   Neurologic:   Cranial nerves 2 - 12 grossly intact, sensation intact     Barbaeu Test:  Left: Normal  (oxymetric Allens) Right: Not Assessed             No results found for: CHLPL, TRIG, HDL, LDLDIRECT, AST, ALT        Impression      · Abnormal stress test    Plan     · Procedure to perform: [facility]  · Planned access: Left radial artery              ROMULO Laurent  11/22/19  9:48 AM

## 2019-11-23 RX ORDER — AMIODARONE HYDROCHLORIDE 200 MG/1
200 TABLET ORAL DAILY
Qty: 30 TABLET | Refills: 4 | Status: SHIPPED | OUTPATIENT
Start: 2019-11-23

## 2019-11-25 LAB
BUN BLDA-MCNC: 23 MG/DL (ref 8–26)
CA-I BLDA-SCNC: 1.15 MMOL/L (ref 1.2–1.32)
CHLORIDE BLDA-SCNC: 95 MMOL/L (ref 98–109)
CO2 BLDA-SCNC: 32 MMOL/L (ref 24–29)
CREAT BLDA-MCNC: 1.7 MG/DL (ref 0.6–1.3)
GLUCOSE BLDC GLUCOMTR-MCNC: 117 MG/DL (ref 70–130)
HCT VFR BLDA CALC: 45 % (ref 38–51)
HGB BLDA-MCNC: 15.3 G/DL (ref 12–17)
POTASSIUM BLDA-SCNC: 3 MMOL/L (ref 3.5–4.9)
SODIUM BLDA-SCNC: 138 MMOL/L (ref 138–146)

## (undated) DEVICE — CATH DIAG EXPO .045 FL3  5F 100CM

## (undated) DEVICE — DEV COMP RAD PRELUDESYNC 24CM

## (undated) DEVICE — INTRO SHEATH PRELUDE IDEAL SPRNG COIL 021 6F 23X80CM

## (undated) DEVICE — CANNULA,ADULT,SOFT-TOUCH,7'TUBE,UC: Brand: PENDING

## (undated) DEVICE — CATH DIAG EXPO M/ PK 5F FL4/FR4 PIG

## (undated) DEVICE — LIMB HOLDERS: Brand: DEROYAL

## (undated) DEVICE — MODEL BT2000 P/N 700287-012KIT CONTENTS: MANIFOLD WITH SALINE AND CONTRAST PORTS, SALINE TUBING WITH SPIKE AND HAND SYRINGE, TRANSDUCER: Brand: BT2000 AUTOMATED MANIFOLD KIT

## (undated) DEVICE — ARM SLING II: Brand: DEROYAL

## (undated) DEVICE — GW INQWIRE FC PTFE STD J/1.5 .035 260

## (undated) DEVICE — INTRO PEELAWAY SAFESHEATH II HEMO10F23CM

## (undated) DEVICE — LEX CATH LAB MINOR: Brand: MEDLINE INDUSTRIES, INC.

## (undated) DEVICE — RESUSCITATOR,MANUAL,ADLT,MASK,TUBE RES: Brand: MEDLINE INDUSTRIES, INC.

## (undated) DEVICE — PK CATH CARD 10

## (undated) DEVICE — MODEL AT P65, P/N 701554-001KIT CONTENTS: HAND CONTROLLER, 3-WAY HIGH-PRESSURE STOPCOCK WITH ROTATING END AND PREMIUM HIGH-PRESSURE TUBING: Brand: ANGIOTOUCH® KIT